# Patient Record
Sex: MALE | Race: WHITE | Employment: FULL TIME | URBAN - METROPOLITAN AREA
[De-identification: names, ages, dates, MRNs, and addresses within clinical notes are randomized per-mention and may not be internally consistent; named-entity substitution may affect disease eponyms.]

---

## 2017-06-22 PROBLEM — M54.12 CERVICAL RADICULOPATHY: Status: ACTIVE | Noted: 2017-06-22

## 2017-06-22 PROBLEM — M54.11 RADICULOPATHY OF OCCIPITO-ATLANTO-AXIAL REGION: Status: ACTIVE | Noted: 2017-06-22

## 2017-06-22 PROBLEM — R20.8 DYSESTHESIA OF MULTIPLE SITES: Status: ACTIVE | Noted: 2017-06-22

## 2017-06-22 PROBLEM — M54.81 OCCIPITAL NEURALGIA OF LEFT SIDE: Status: ACTIVE | Noted: 2017-06-22

## 2017-07-08 ENCOUNTER — HOSPITAL ENCOUNTER (OUTPATIENT)
Dept: MRI IMAGING | Age: 62
Discharge: HOME | End: 2017-07-08
Attending: PSYCHIATRY & NEUROLOGY
Payer: COMMERCIAL

## 2017-07-08 DIAGNOSIS — M54.11 RADICULOPATHY OF OCCIPITO-ATLANTO-AXIAL REGION: ICD-10-CM

## 2017-07-08 LAB — CREAT BLD-MCNC: 1.2 MG/DL (ref 0.6–1)

## 2017-07-08 PROCEDURE — 74011250636 HC RX REV CODE- 250/636: Performed by: PSYCHIATRY & NEUROLOGY

## 2017-07-08 PROCEDURE — 82565 ASSAY OF CREATININE: CPT

## 2017-07-08 PROCEDURE — 70553 MRI BRAIN STEM W/O & W/DYE: CPT

## 2017-07-08 PROCEDURE — A9577 INJ MULTIHANCE: HCPCS | Performed by: PSYCHIATRY & NEUROLOGY

## 2017-07-08 RX ORDER — SODIUM CHLORIDE 0.9 % (FLUSH) 0.9 %
10 SYRINGE (ML) INJECTION
Status: COMPLETED | OUTPATIENT
Start: 2017-07-08 | End: 2017-07-08

## 2017-07-08 RX ADMIN — GADOBENATE DIMEGLUMINE 20 ML: 529 INJECTION, SOLUTION INTRAVENOUS at 14:29

## 2017-07-08 RX ADMIN — Medication 10 ML: at 14:29

## 2017-07-11 NOTE — PROGRESS NOTES
Dear Mr. Laila Rivera,    I have reviewed your brain MRI pictures. There are several tiny spots scattered in the brain white matter which is benign in nature, frequently seen in normal people age above 25-year-old, or people with hypertension, diabetes, smoking, migraine headaches and head injury. No evidence of stroke or brain tumor. Please write me back for any question. Thank you.     Dr Josselin Glass

## 2017-09-01 ENCOUNTER — HOSPITAL ENCOUNTER (OUTPATIENT)
Dept: GENERAL RADIOLOGY | Age: 62
Discharge: HOME | End: 2017-09-01
Payer: COMMERCIAL

## 2017-09-01 DIAGNOSIS — M54.2 NECK PAIN: ICD-10-CM

## 2017-09-01 PROCEDURE — 72050 X-RAY EXAM NECK SPINE 4/5VWS: CPT

## 2017-09-07 NOTE — PROGRESS NOTES
Correction:    I went back to review the pictures and found out that it was a x-ray, not MRI of the cervical spine. No information for spinal cord.

## 2017-09-07 NOTE — PROGRESS NOTES
Tayler Moore, please arrange a chest x-ray PA + L. thx    Mr. Nunez Chamber,    MRI of the cervical spine did show multiple level spurs causing narrowing of nerve pathway, which caused her neck pain. Spinal cord was normal.    Incidentally it showed mildly increased density in the superior of the right lung close to heart. I would arrange a chest x-ray for you for further evaluation.     Dr Barone Civil

## 2017-09-19 ENCOUNTER — HOSPITAL ENCOUNTER (OUTPATIENT)
Dept: GENERAL RADIOLOGY | Age: 62
Discharge: HOME | End: 2017-09-19
Payer: COMMERCIAL

## 2017-09-19 DIAGNOSIS — R93.7 ABNORMAL X-RAY OF CERVICAL SPINE: ICD-10-CM

## 2017-09-19 PROCEDURE — 71020 XR CHEST PA LAT: CPT

## 2017-09-28 PROBLEM — M54.16 LUMBAR RADICULOPATHY: Status: ACTIVE | Noted: 2017-06-22

## 2017-09-28 PROBLEM — R20.2 RIGHT LEG PARESTHESIAS: Status: ACTIVE | Noted: 2017-09-28

## 2017-10-16 PROBLEM — G56.03 BILATERAL CARPAL TUNNEL SYNDROME: Status: ACTIVE | Noted: 2017-10-16

## 2017-10-16 PROBLEM — G60.8 SENSORY POLYNEUROPATHY: Status: ACTIVE | Noted: 2017-10-16

## 2018-11-27 NOTE — PROGRESS NOTES
Mr. Isaac Oms,    Your chest x-ray is normal.  No lung mass was seen per report.      Dr Morgan Mater Mauc Instructions: By selecting yes to the question below the MAUC number will be added into the note.  This will be calculated automatically based on the diagnosis chosen, the size entered, the body zone selected (H,M,L) and the specific indications you chose. You will also have the option to override the Mohs AUC if you disagree with the automatically calculated number and this option is found in the Case Summary tab.